# Patient Record
Sex: MALE | Race: WHITE | URBAN - METROPOLITAN AREA
[De-identification: names, ages, dates, MRNs, and addresses within clinical notes are randomized per-mention and may not be internally consistent; named-entity substitution may affect disease eponyms.]

---

## 2024-02-19 ENCOUNTER — OFFICE VISIT (OUTPATIENT)
Dept: GASTROENTEROLOGY | Age: 39
End: 2024-02-19
Payer: COMMERCIAL

## 2024-02-19 ENCOUNTER — TELEPHONE (OUTPATIENT)
Dept: GASTROENTEROLOGY | Age: 39
End: 2024-02-19

## 2024-02-19 VITALS — WEIGHT: 315 LBS | TEMPERATURE: 98.1 F | DIASTOLIC BLOOD PRESSURE: 87 MMHG | SYSTOLIC BLOOD PRESSURE: 165 MMHG

## 2024-02-19 DIAGNOSIS — E66.01 MORBID OBESITY (HCC): Primary | ICD-10-CM

## 2024-02-19 DIAGNOSIS — K58.1 IRRITABLE BOWEL SYNDROME WITH CONSTIPATION: ICD-10-CM

## 2024-02-19 DIAGNOSIS — R10.13 ABDOMINAL PAIN, EPIGASTRIC: ICD-10-CM

## 2024-02-19 DIAGNOSIS — K21.9 GASTROESOPHAGEAL REFLUX DISEASE, UNSPECIFIED WHETHER ESOPHAGITIS PRESENT: ICD-10-CM

## 2024-02-19 DIAGNOSIS — R13.19 OTHER DYSPHAGIA: ICD-10-CM

## 2024-02-19 DIAGNOSIS — K21.9 GASTROESOPHAGEAL REFLUX DISEASE, UNSPECIFIED WHETHER ESOPHAGITIS PRESENT: Primary | ICD-10-CM

## 2024-02-19 DIAGNOSIS — K59.01 SLOW TRANSIT CONSTIPATION: ICD-10-CM

## 2024-02-19 PROCEDURE — G8427 DOCREV CUR MEDS BY ELIG CLIN: HCPCS | Performed by: INTERNAL MEDICINE

## 2024-02-19 PROCEDURE — G8421 BMI NOT CALCULATED: HCPCS | Performed by: INTERNAL MEDICINE

## 2024-02-19 PROCEDURE — G8484 FLU IMMUNIZE NO ADMIN: HCPCS | Performed by: INTERNAL MEDICINE

## 2024-02-19 PROCEDURE — 4004F PT TOBACCO SCREEN RCVD TLK: CPT | Performed by: INTERNAL MEDICINE

## 2024-02-19 PROCEDURE — 99204 OFFICE O/P NEW MOD 45 MIN: CPT | Performed by: INTERNAL MEDICINE

## 2024-02-19 RX ORDER — CLONAZEPAM 0.5 MG/1
0.5 TABLET ORAL 2 TIMES DAILY PRN
COMMUNITY
Start: 2021-07-26

## 2024-02-19 RX ORDER — DICYCLOMINE HYDROCHLORIDE 10 MG/1
20 CAPSULE ORAL
COMMUNITY
Start: 2023-03-24

## 2024-02-19 RX ORDER — HYDROXYZINE HYDROCHLORIDE 25 MG/1
25 TABLET, FILM COATED ORAL
COMMUNITY
Start: 2023-07-10

## 2024-02-19 RX ORDER — DOXEPIN HYDROCHLORIDE 10 MG/1
10 CAPSULE ORAL NIGHTLY
COMMUNITY

## 2024-02-19 RX ORDER — PROPRANOLOL HYDROCHLORIDE 10 MG/1
10 TABLET ORAL 2 TIMES DAILY PRN
COMMUNITY

## 2024-02-19 RX ORDER — INSULIN GLARGINE 100 [IU]/ML
65 INJECTION, SOLUTION SUBCUTANEOUS 2 TIMES DAILY
COMMUNITY

## 2024-02-19 RX ORDER — AMLODIPINE BESYLATE 5 MG/1
5 TABLET ORAL
COMMUNITY
Start: 2021-08-23

## 2024-02-19 RX ORDER — FAMOTIDINE 20 MG/1
20 TABLET, FILM COATED ORAL
COMMUNITY
Start: 2022-02-23

## 2024-02-19 RX ORDER — PANTOPRAZOLE SODIUM 40 MG/1
40 TABLET, DELAYED RELEASE ORAL
COMMUNITY
Start: 2021-11-15

## 2024-02-19 RX ORDER — SUCRALFATE ORAL 1 G/10ML
1000 SUSPENSION ORAL 4 TIMES DAILY
COMMUNITY
Start: 2024-01-29 | End: 2024-02-28

## 2024-02-19 ASSESSMENT — ENCOUNTER SYMPTOMS
WHEEZING: 0
CHOKING: 0
DIARRHEA: 0
BLOOD IN STOOL: 0
VOMITING: 0
COLOR CHANGE: 0
COUGH: 0
RECTAL PAIN: 0
ANAL BLEEDING: 0
SHORTNESS OF BREATH: 0
TROUBLE SWALLOWING: 1
CONSTIPATION: 1
ABDOMINAL PAIN: 1
ABDOMINAL DISTENTION: 0
SORE THROAT: 0
NAUSEA: 0

## 2024-02-19 NOTE — PROGRESS NOTES
General: Bowel sounds are normal.      Palpations: Abdomen is soft.   Genitourinary:     Rectum: Normal.   Musculoskeletal:         General: Normal range of motion.      Cervical back: Normal range of motion and neck supple.   Skin:     General: Skin is warm.   Neurological:      Mental Status: He is alert and oriented to person, place, and time.      Deep Tendon Reflexes: Reflexes are normal and symmetric.           LABORATORY DATA: Reviewed  Lab Results   Component Value Date    WBC 9.0 09/11/2013    HGB 13.5 09/11/2013    HCT 40.6 (L) 09/11/2013    MCV 84.1 09/11/2013     09/11/2013     09/11/2013    K 3.9 09/11/2013    CL 99 09/11/2013    CO2 27 09/11/2013    BUN 16 09/11/2013    CREATININE 0.90 09/11/2013    LABPROT 8.2 06/01/2012    LABALBU 4.5 09/11/2013    BILITOT 0.54 09/11/2013    ALKPHOS 81 09/11/2013    AST 43 (H) 09/11/2013    ALT 56 (H) 09/11/2013         Lab Results   Component Value Date    RBC 4.83 09/11/2013    HGB 13.5 09/11/2013    MCV 84.1 09/11/2013    MCH 27.9 09/11/2013    MCHC 33.2 09/11/2013    RDW 15.3 09/11/2013    MPV 8.4 09/11/2013    BASOPCT 1 09/11/2013    LYMPHSABS 2.40 09/11/2013    MONOSABS 0.60 09/11/2013    NEUTROABS 5.80 09/11/2013    EOSABS 0.10 09/11/2013    BASOSABS 0.10 09/11/2013         DIAGNOSTIC TESTING:     No results found.       Assessment  1. Gastroesophageal reflux disease, unspecified whether esophagitis present    2. Abdominal pain, epigastric    3. Other dysphagia    4. Slow transit constipation    5. Irritable bowel syndrome with constipation        Plan    Plan EGD     Refer to [facility] weight loss clinic    The Endoscopic procedure was explained to the patient in detail  The prep and NPO were explained  All the Risks, Benefits, and Alternatives were explained  Risk of Bleeding, Perforation and Cardio Respiratory risks were explained  his questions were answered  The procedure has been scheduled with the  in the office  Patient was asked

## 2024-02-20 ENCOUNTER — ANESTHESIA EVENT (OUTPATIENT)
Dept: OPERATING ROOM | Age: 39
End: 2024-02-20
Payer: COMMERCIAL

## 2024-02-20 ENCOUNTER — ANESTHESIA (OUTPATIENT)
Dept: OPERATING ROOM | Age: 39
End: 2024-02-20
Payer: COMMERCIAL

## 2024-02-20 ENCOUNTER — HOSPITAL ENCOUNTER (OUTPATIENT)
Age: 39
Setting detail: OUTPATIENT SURGERY
Discharge: HOME | End: 2024-02-20
Attending: INTERNAL MEDICINE | Admitting: INTERNAL MEDICINE
Payer: COMMERCIAL

## 2024-02-20 VITALS
DIASTOLIC BLOOD PRESSURE: 92 MMHG | TEMPERATURE: 98.2 F | OXYGEN SATURATION: 98 % | SYSTOLIC BLOOD PRESSURE: 177 MMHG | BODY MASS INDEX: 40.43 KG/M2 | HEART RATE: 88 BPM | WEIGHT: 315 LBS | RESPIRATION RATE: 28 BRPM | HEIGHT: 74 IN

## 2024-02-20 DIAGNOSIS — R13.19 OTHER DYSPHAGIA: ICD-10-CM

## 2024-02-20 DIAGNOSIS — K58.1 IRRITABLE BOWEL SYNDROME WITH CONSTIPATION: Primary | ICD-10-CM

## 2024-02-20 DIAGNOSIS — K59.01 SLOW TRANSIT CONSTIPATION: ICD-10-CM

## 2024-02-20 DIAGNOSIS — R10.13 ABDOMINAL PAIN, EPIGASTRIC: ICD-10-CM

## 2024-02-20 LAB
GLUCOSE BLD-MCNC: 210 MG/DL (ref 75–110)
GLUCOSE BLD-MCNC: 237 MG/DL (ref 75–110)

## 2024-02-20 PROCEDURE — 3700000000 HC ANESTHESIA ATTENDED CARE: Performed by: INTERNAL MEDICINE

## 2024-02-20 PROCEDURE — 7100000011 HC PHASE II RECOVERY - ADDTL 15 MIN: Performed by: INTERNAL MEDICINE

## 2024-02-20 PROCEDURE — 7100000010 HC PHASE II RECOVERY - FIRST 15 MIN: Performed by: INTERNAL MEDICINE

## 2024-02-20 PROCEDURE — 2709999900 HC NON-CHARGEABLE SUPPLY: Performed by: INTERNAL MEDICINE

## 2024-02-20 PROCEDURE — 6360000002 HC RX W HCPCS: Performed by: NURSE ANESTHETIST, CERTIFIED REGISTERED

## 2024-02-20 PROCEDURE — 2580000003 HC RX 258: Performed by: ANESTHESIOLOGY

## 2024-02-20 PROCEDURE — 43235 EGD DIAGNOSTIC BRUSH WASH: CPT | Performed by: INTERNAL MEDICINE

## 2024-02-20 PROCEDURE — 2500000003 HC RX 250 WO HCPCS: Performed by: NURSE ANESTHETIST, CERTIFIED REGISTERED

## 2024-02-20 PROCEDURE — 82947 ASSAY GLUCOSE BLOOD QUANT: CPT

## 2024-02-20 PROCEDURE — 3609017100 HC EGD: Performed by: INTERNAL MEDICINE

## 2024-02-20 RX ORDER — SODIUM CHLORIDE 9 MG/ML
INJECTION, SOLUTION INTRAVENOUS PRN
Status: DISCONTINUED | OUTPATIENT
Start: 2024-02-20 | End: 2024-02-20 | Stop reason: HOSPADM

## 2024-02-20 RX ORDER — LIDOCAINE HYDROCHLORIDE 10 MG/ML
1 INJECTION, SOLUTION EPIDURAL; INFILTRATION; INTRACAUDAL; PERINEURAL
Status: DISCONTINUED | OUTPATIENT
Start: 2024-02-21 | End: 2024-02-20 | Stop reason: HOSPADM

## 2024-02-20 RX ORDER — ONDANSETRON 2 MG/ML
4 INJECTION INTRAMUSCULAR; INTRAVENOUS
Status: DISCONTINUED | OUTPATIENT
Start: 2024-02-20 | End: 2024-02-20 | Stop reason: HOSPADM

## 2024-02-20 RX ORDER — SODIUM CHLORIDE 9 MG/ML
INJECTION, SOLUTION INTRAVENOUS CONTINUOUS
Status: DISCONTINUED | OUTPATIENT
Start: 2024-02-20 | End: 2024-02-20 | Stop reason: HOSPADM

## 2024-02-20 RX ORDER — SODIUM CHLORIDE, SODIUM LACTATE, POTASSIUM CHLORIDE, CALCIUM CHLORIDE 600; 310; 30; 20 MG/100ML; MG/100ML; MG/100ML; MG/100ML
INJECTION, SOLUTION INTRAVENOUS CONTINUOUS
Status: DISCONTINUED | OUTPATIENT
Start: 2024-02-20 | End: 2024-02-20 | Stop reason: HOSPADM

## 2024-02-20 RX ORDER — FENTANYL CITRATE 50 UG/ML
25 INJECTION, SOLUTION INTRAMUSCULAR; INTRAVENOUS EVERY 5 MIN PRN
Status: DISCONTINUED | OUTPATIENT
Start: 2024-02-20 | End: 2024-02-20 | Stop reason: HOSPADM

## 2024-02-20 RX ORDER — SODIUM CHLORIDE 0.9 % (FLUSH) 0.9 %
5-40 SYRINGE (ML) INJECTION EVERY 12 HOURS SCHEDULED
Status: DISCONTINUED | OUTPATIENT
Start: 2024-02-20 | End: 2024-02-20 | Stop reason: HOSPADM

## 2024-02-20 RX ORDER — SODIUM CHLORIDE 0.9 % (FLUSH) 0.9 %
5-40 SYRINGE (ML) INJECTION PRN
Status: DISCONTINUED | OUTPATIENT
Start: 2024-02-20 | End: 2024-02-20 | Stop reason: HOSPADM

## 2024-02-20 RX ORDER — HYDROMORPHONE HYDROCHLORIDE 1 MG/ML
0.5 INJECTION, SOLUTION INTRAMUSCULAR; INTRAVENOUS; SUBCUTANEOUS EVERY 5 MIN PRN
Status: DISCONTINUED | OUTPATIENT
Start: 2024-02-20 | End: 2024-02-20 | Stop reason: HOSPADM

## 2024-02-20 RX ORDER — PROPOFOL 10 MG/ML
INJECTION, EMULSION INTRAVENOUS PRN
Status: DISCONTINUED | OUTPATIENT
Start: 2024-02-20 | End: 2024-02-20 | Stop reason: SDUPTHER

## 2024-02-20 RX ORDER — LIDOCAINE HYDROCHLORIDE 20 MG/ML
INJECTION, SOLUTION INFILTRATION; PERINEURAL PRN
Status: DISCONTINUED | OUTPATIENT
Start: 2024-02-20 | End: 2024-02-20 | Stop reason: SDUPTHER

## 2024-02-20 RX ADMIN — SODIUM CHLORIDE, POTASSIUM CHLORIDE, SODIUM LACTATE AND CALCIUM CHLORIDE: 600; 310; 30; 20 INJECTION, SOLUTION INTRAVENOUS at 07:51

## 2024-02-20 RX ADMIN — PROPOFOL 100 MG: 10 INJECTION, EMULSION INTRAVENOUS at 09:12

## 2024-02-20 RX ADMIN — LIDOCAINE HYDROCHLORIDE 100 MG: 20 INJECTION, SOLUTION INFILTRATION; PERINEURAL at 09:12

## 2024-02-20 RX ADMIN — PROPOFOL 100 MG: 10 INJECTION, EMULSION INTRAVENOUS at 09:13

## 2024-02-20 RX ADMIN — PROPOFOL 30 MG: 10 INJECTION, EMULSION INTRAVENOUS at 09:15

## 2024-02-20 ASSESSMENT — PAIN - FUNCTIONAL ASSESSMENT
PAIN_FUNCTIONAL_ASSESSMENT: 0-10
PAIN_FUNCTIONAL_ASSESSMENT: NONE - DENIES PAIN

## 2024-02-20 ASSESSMENT — PAIN DESCRIPTION - DESCRIPTORS: DESCRIPTORS: ACHING

## 2024-02-20 NOTE — H&P
History and Physical GI Update    Pt Name: Clive Carlos  MRN: [id number]  YOB: 1985  Date of evaluation: 2/20/2024      [x] I have reviewed the THE GASTROENTEROLOGY OFFICE PROGRESS NOTE found in [facility] dated 1/19/2024  by Dr. SEARS  which meets the criteria for an Interval History and Physical note and is attached below.    [x] I have examined  Clive Carlos and there are no changes to the patient or plans for a EGD .by Dr SEARS  for EVALUATION OF GERD AND DYSPHAGIA     Denies history of ulcers, hiatal hernia,HAS  acid reflux/GERD, AND  IBS. Previous EGD: Yes.  2012.      Patient today denies  fever, chills, night sweats, pain or unexplained weight loss. HE DOES NOT TAKE BLOOD THINNERS.HE HAS DIABETES. BLOOD SUGAR TODAY .PATIENT IS MORBIDLY OBESE AND HAS SLEEP APNEA  BMI IS 57.26    Vital signs: BP (!) 164/85   Pulse 95   Temp 96.8 °F (36 °C)   Resp 18   Ht 1.88 m (6' 2\")   Wt (!) 202.3 kg (446 lb)   SpO2 97%   BMI 57.26 kg/m²     Allergies:  Patient has no known allergies.    Medications:   No current facility-administered medications on file prior to encounter.     Current Outpatient Medications on File Prior to Encounter   Medication Sig Dispense Refill    amLODIPine (NORVASC) 5 MG tablet 1 tablet      clonazePAM (KLONOPIN) 0.5 MG tablet Take 1 tablet by mouth 2 times daily as needed.      diclofenac sodium (VOLTAREN) 1 % GEL APPLY 2 GRAMS TO SKIN FOUR TIMES A DAY FOR OSTEOARTHRITIS TO AFFECTED JOINT(S) AS DIRECTED FOR PAIN (USE ENCLOSED DOSING CARD TO ACCURATELY MEASURE EACH DOSE)  OF THE HAND, ELBOW OR WRIST. MAXIMUM 8G/DAY PER JOINT(  5/9/23) TO AFFECTED JOINT(S) AS DIRECTED FOR PAIN (USE ENCLOSED DOSING CARD TO ACCURATELY MEASURE EACH DOSE)  OF THE HAND, ELBOW OR WRIST. MAXIMUM 8G/DAY PER JOINT(  5/9/23)      dicyclomine (BENTYL) 10 MG capsule 2 capsules      doxepin (SINEQUAN) 10 MG capsule Take 1 capsule by mouth nightly      empagliflozin (JARDIANCE) 25 MG

## 2024-02-20 NOTE — ANESTHESIA PRE PROCEDURE
02/20/24 0733 02/20/24 0741   BP: (!) 193/104    Pulse: 95    Resp: 18    Temp: 96.8 °F (36 °C)    TempSrc:  Temporal   SpO2: 97%    Weight:  (!) 202.3 kg (446 lb)   Height:  1.88 m (6' 2\")                                              BP Readings from Last 3 Encounters:   02/20/24 (!) 193/104   02/19/24 (!) 165/87       NPO Status: Time of last liquid consumption: 2300                        Time of last solid consumption: 1830                        Date of last liquid consumption: 02/19/24                        Date of last solid food consumption: 02/19/24    BMI:   Wt Readings from Last 3 Encounters:   02/20/24 (!) 202.3 kg (446 lb)   02/19/24 (!) 202.8 kg (447 lb)     Body mass index is 57.26 kg/m².    CBC:   Lab Results   Component Value Date/Time    WBC 9.0 09/11/2013 03:56 PM    RBC 4.83 09/11/2013 03:56 PM    RBC 5.00 06/01/2012 11:25 PM    HGB 13.5 09/11/2013 03:56 PM    HCT 40.6 09/11/2013 03:56 PM    MCV 84.1 09/11/2013 03:56 PM    RDW 15.3 09/11/2013 03:56 PM     09/11/2013 03:56 PM     06/01/2012 11:25 PM       CMP:   Lab Results   Component Value Date/Time     09/11/2013 03:56 PM    K 3.9 09/11/2013 03:56 PM    CL 99 09/11/2013 03:56 PM    CO2 27 09/11/2013 03:56 PM    BUN 16 09/11/2013 03:56 PM    CREATININE 0.90 09/11/2013 03:56 PM    GFRAA >60 09/11/2013 03:56 PM    AGRATIO 1.3 09/11/2013 03:56 PM    LABGLOM >60 09/11/2013 03:56 PM    GLUCOSE 164 09/11/2013 03:56 PM    GLUCOSE 223 06/05/2012 05:30 AM    PROT 7.9 09/11/2013 03:56 PM    CALCIUM 9.6 09/11/2013 03:56 PM    BILITOT 0.54 09/11/2013 03:56 PM    ALKPHOS 81 09/11/2013 03:56 PM    AST 43 09/11/2013 03:56 PM    ALT 56 09/11/2013 03:56 PM       POC Tests: No results for input(s): \"POCGLU\", \"POCNA\", \"POCK\", \"POCCL\", \"POCBUN\", \"POCHEMO\", \"POCHCT\" in the last 72 hours.    Coags: No results found for: \"PROTIME\", \"INR\", \"APTT\"    HCG (If Applicable): No results found for: \"PREGTESTUR\", \"PREGSERUM\", \"HCG\", \"HCGQUANT\"     ABGs: No

## 2024-02-20 NOTE — ANESTHESIA POSTPROCEDURE EVALUATION
Department of Anesthesiology  Postprocedure Note    Patient: Clive Carlos  MRN: [id number]  YOB: 1985  Date of evaluation: 2/20/2024    Procedure Summary       Date: 02/20/24 Room / Location: [address]    Anesthesia Start: 0908 Anesthesia Stop: 0926    Procedure: EGD ESOPHAGOGASTRODUODENOSCOPY Diagnosis:       Gastroesophageal reflux disease, unspecified whether esophagitis present      (Gastroesophageal reflux disease, unspecified whether esophagitis present [K21.9])    Surgeons: Jael Brian MD Responsible Provider: Ginger Figueredo MD    Anesthesia Type: MAC ASA Status: 3            Anesthesia Type: No value filed.    Dulce Phase I: Dulce Score: 10    Dulce Phase II: Dulce Score: 10    Anesthesia Post Evaluation    Patient location during evaluation: PACU  Patient participation: complete - patient participated  Level of consciousness: awake and alert  Airway patency: patent  Nausea & Vomiting: no nausea and no vomiting  Cardiovascular status: hemodynamically stable  Respiratory status: acceptable  Hydration status: euvolemic  Pain management: adequate    No notable events documented.

## 2024-02-20 NOTE — OP NOTE
PROCEDURE NOTE    DATE OF PROCEDURE: 2/20/2024     SURGEON: Jael Brian MD    ASSISTANT: None    PREOPERATIVE DIAGNOSIS: EPIG PAINS  NAUSEA  DYSPHAGIA  GERD    POSTOPERATIVE DIAGNOSIS: As described below    OPERATION: Upper GI endoscopy with Biopsy    ANESTHESIA: MAC PER ANESTHESIA     ESTIMATED BLOOD LOSS: Less than 50 ml    COMPLICATIONS: None.     SPECIMENS:  Was Not Obtained    HISTORY: The patient is a 39 y.o. year old male with history of above preop diagnosis.  I recommended esophagogastroduodenoscopy with possible biopsy and I explained the risk, benefits, expected outcome, and alternatives to the procedure.  Risks included but are not limited to bleeding, infection, respiratory distress, hypotension, and perforation of the esophagus, stomach, or duodenum.  Patient understands and is in agreement.    PROCEDURE: The patient was given IV conscious sedation.  The patient's SPO2 remained above 90% throughout the procedure. The gastroscope was inserted orally and advanced under direct vision through the esophagus, through the stomach, through the pylorus, and into the descending duodenum.      Findings:    Retropharyngeal area was grossly normal appearing    Esophagus: normal  SOME REFLEXED FOOD FROM STOMACH  NO LUMINAL OBSTRUCTION, LESIONS ESOPHAGITIS NOTED   Stomach:  LARGE AMOUNT OF RETAINED SOLD FOOD  VISUALIZATION WAS VERY LIMITED  SCOPE WAS MANAGED TO BE ADVANCED BETWEEN FOOD INTO ANTRUM  NO LUMINAL OBSTRUCTION    Duodenum:     Descending: abnormal: RETAINED PASTY FOOD  NO OBVIOUS LUMINAL OBSTRUCTION     Bulb: abnormal: AS ABOVE    The scope was removed and the patient tolerated the procedure well.     Recommendations/Plan:   GASTRIC EMPTYING SCAN  REPEAT EGD AFTER THAT  F/U In Office in 1-2 weeks  Discussed with the family  Post sedation patient was stable with stable vital signs and stable O2 saturations    Electronically signed by Jael Brian MD  on 2/20/2024 at 9:18 AM

## 2024-02-20 NOTE — DISCHARGE INSTRUCTIONS
Upper GI Endoscopy: What to Expect at Home  Your Recovery  After you have an endoscopy, you will stay at the hospital or clinic for 1 to 2 hours. This will allow the medicine to wear off. You will be able to go home after your doctor or nurse checks to make sure you are not having any problems.  You may have a sore throat for a day or two after the test.  This care sheet gives you a general idea about what to expect after the test.  How can you care for yourself at home?  Activity  Rest as much as you need to after you go home.  You should be able to go back to your usual activities the day after the test.  Diet  Follow your doctor's directions for eating after the test.  Drink plenty of fluids (unless your doctor has told you not to).  Follow-up care is a key part of your treatment and safety. Be sure to make and go to all appointments, and call your doctor if you are having problems. It's also a good idea to know your test results and keep a list of the medicines you take.  When should you call for help?  Call 911 anytime you think you may need emergency care. For example, call if:  You passed out (lost consciousness).  You cough up blood.  You vomit blood or what looks like coffee grounds.  You pass maroon or very bloody stools.  Call your doctor now or seek immediate medical care if:  You have trouble swallowing.  You have belly pain.  Your stools are black and tarlike or have streaks of blood.  You are sick to your stomach or cannot keep fluids down.  Watch closely for changes in your health, and be sure to contact your doctor if:  Your throat still hurts after a day or two.  You do not get better as expected.   Where can you learn more?   Go to [email or web address] and sign in to your [employer] account. Enter J454 in the Search Health Information box to learn more about “Upper GI Endoscopy: What to Expect at Home.”    If you do not have an account, please click on the “Sign Up Now” link.

## 2024-03-19 ENCOUNTER — OFFICE VISIT (OUTPATIENT)
Dept: GASTROENTEROLOGY | Age: 39
End: 2024-03-19
Payer: COMMERCIAL

## 2024-03-19 VITALS
WEIGHT: 315 LBS | BODY MASS INDEX: 57.26 KG/M2 | SYSTOLIC BLOOD PRESSURE: 189 MMHG | DIASTOLIC BLOOD PRESSURE: 95 MMHG | TEMPERATURE: 97.7 F

## 2024-03-19 DIAGNOSIS — K58.1 IRRITABLE BOWEL SYNDROME WITH CONSTIPATION: ICD-10-CM

## 2024-03-19 DIAGNOSIS — K59.01 SLOW TRANSIT CONSTIPATION: Primary | ICD-10-CM

## 2024-03-19 DIAGNOSIS — K31.84 GASTROPARESIS: ICD-10-CM

## 2024-03-19 DIAGNOSIS — K31.89 RETAINED FOOD IN STOMACH: ICD-10-CM

## 2024-03-19 DIAGNOSIS — R13.19 OTHER DYSPHAGIA: ICD-10-CM

## 2024-03-19 DIAGNOSIS — E66.01 MORBID OBESITY (HCC): ICD-10-CM

## 2024-03-19 PROCEDURE — 4004F PT TOBACCO SCREEN RCVD TLK: CPT | Performed by: INTERNAL MEDICINE

## 2024-03-19 PROCEDURE — 99214 OFFICE O/P EST MOD 30 MIN: CPT | Performed by: INTERNAL MEDICINE

## 2024-03-19 PROCEDURE — G8484 FLU IMMUNIZE NO ADMIN: HCPCS | Performed by: INTERNAL MEDICINE

## 2024-03-19 PROCEDURE — G8427 DOCREV CUR MEDS BY ELIG CLIN: HCPCS | Performed by: INTERNAL MEDICINE

## 2024-03-19 PROCEDURE — G8417 CALC BMI ABV UP PARAM F/U: HCPCS | Performed by: INTERNAL MEDICINE

## 2024-03-19 RX ORDER — LINACLOTIDE 290 UG/1
290 CAPSULE, GELATIN COATED ORAL
Qty: 30 CAPSULE | Refills: 3 | Status: SHIPPED | OUTPATIENT
Start: 2024-03-19

## 2024-03-19 ASSESSMENT — ENCOUNTER SYMPTOMS
COUGH: 0
WHEEZING: 0
SORE THROAT: 0
RECTAL PAIN: 0
ANAL BLEEDING: 0
CONSTIPATION: 1
ABDOMINAL PAIN: 1
NAUSEA: 0
TROUBLE SWALLOWING: 1
COLOR CHANGE: 0
ABDOMINAL DISTENTION: 0
DIARRHEA: 0
SHORTNESS OF BREATH: 0
CHOKING: 0
BLOOD IN STOOL: 0
VOMITING: 0

## 2024-03-19 NOTE — PROGRESS NOTES
GI CLINIC FOLLOW UP    NTERVAL HISTORY:   No referring provider defined for this encounter.    Chief Complaint   Patient presents with    Results     Pt is here today for a f/u from EGD procedure completed on 2/20/24.       1. Slow transit constipation    2. Irritable bowel syndrome with constipation    3. Other dysphagia    4. Gastroparesis    5. Retained food in stomach    6. Morbid obesity (HCC)      This patient seen my office as a follow-up after his recent upper endoscopy which revealed presence of large amount of food in the stomach gastric emptying scan was ordered but has not been done yet    He is a  and has a lot of issues with abdominal bloating gas cramping he says that he had a colonoscopy done in [employer] and was found to have polyps however they were not removed?  He was told that they may give him a problem in future?    Complains of significant constipation issues 1 bowel movements every 4 to 5 days with some straining bloating gas and cramping    He denies taking any narcotic pain medications    Patient's has morbid obesity    No overt rectal bleeding or melanotic stool    No known family history for colon cancer    Patient has been complaining of some abdominal pains, off and on cramping  Also complains of abdominal bloating and gas  Has off and on nausea without any sig vomiting    Has no weight loss  Has some anxiety issues              HISTORY OF PRESENT ILLNESS: Mr.Michael ANAM Carlos is a 39 y.o. male with a past history remarkable for , referred for evaluation of   Chief Complaint   Patient presents with    Results     Pt is here today for a f/u from EGD procedure completed on 2/20/24.   .    Past Medical,Family, and Social History reviewed and does contribute to the patient presenting condition.    Patient's PMH/PSH,SH,PSYCH Hx, MEDs, ALLERGIES, and ROS were all reviewed and updated in the appropriate sections.    PAST MEDICAL HISTORY:  Past Medical History:   Diagnosis Date

## 2024-03-20 NOTE — TELEPHONE ENCOUNTER
Procedure scheduled/Dr KEO Brian  Procedure:COLON  Date:08/28/2024  Time:9:00 AM  Hospital:[facility]  Bowel Prep instructions given:MIRALAX/DULC  In office/via phone: IN OFFICE  Clearance needed:NO

## 2024-04-18 DIAGNOSIS — K59.01 SLOW TRANSIT CONSTIPATION: Primary | ICD-10-CM

## 2024-04-18 DIAGNOSIS — K58.1 IRRITABLE BOWEL SYNDROME WITH CONSTIPATION: ICD-10-CM

## 2024-04-18 NOTE — TELEPHONE ENCOUNTER
Patient's wife called as the Linzess was denied until the Trulance is tried and failed. Please sign Trulance RX

## 2024-04-26 ENCOUNTER — TELEPHONE (OUTPATIENT)
Dept: GASTROENTEROLOGY | Age: 39
End: 2024-04-26

## 2024-04-26 NOTE — TELEPHONE ENCOUNTER
Carrie called to report patient has scheduled a total of five gastric emptying study test and have \"No showed\" to all of them. They have tried to reach out to patient multiple times with no success.

## 2024-05-09 RX ORDER — BISACODYL 5 MG/1
TABLET, DELAYED RELEASE ORAL
Qty: 4 TABLET | Refills: 0 | Status: SHIPPED | OUTPATIENT
Start: 2024-05-09

## 2024-05-09 RX ORDER — POLYETHYLENE GLYCOL 3350 17 G/17G
POWDER, FOR SOLUTION ORAL
Qty: 238 G | Refills: 0 | Status: SHIPPED | OUTPATIENT
Start: 2024-05-09

## 2024-05-16 ENCOUNTER — TELEPHONE (OUTPATIENT)
Dept: BARIATRICS/WEIGHT MGMT | Age: 39
End: 2024-05-16

## 2024-05-16 NOTE — TELEPHONE ENCOUNTER
I left a message for the Patient to call the office regarding missed appt to reschedule on 05/16/24.

## 2024-05-31 ENCOUNTER — INITIAL CONSULT (OUTPATIENT)
Dept: BARIATRICS/WEIGHT MGMT | Age: 39
End: 2024-05-31
Payer: COMMERCIAL

## 2024-05-31 VITALS
DIASTOLIC BLOOD PRESSURE: 70 MMHG | HEART RATE: 90 BPM | HEIGHT: 74 IN | BODY MASS INDEX: 40.43 KG/M2 | WEIGHT: 315 LBS | SYSTOLIC BLOOD PRESSURE: 130 MMHG

## 2024-05-31 DIAGNOSIS — K31.84 GASTROPARESIS: ICD-10-CM

## 2024-05-31 DIAGNOSIS — E66.01 MORBID OBESITY WITH BMI OF 50.0-59.9, ADULT (HCC): ICD-10-CM

## 2024-05-31 DIAGNOSIS — K21.9 GASTROESOPHAGEAL REFLUX DISEASE WITHOUT ESOPHAGITIS: Primary | ICD-10-CM

## 2024-05-31 PROCEDURE — 4004F PT TOBACCO SCREEN RCVD TLK: CPT | Performed by: SURGERY

## 2024-05-31 PROCEDURE — 99204 OFFICE O/P NEW MOD 45 MIN: CPT | Performed by: SURGERY

## 2024-05-31 PROCEDURE — G8427 DOCREV CUR MEDS BY ELIG CLIN: HCPCS | Performed by: SURGERY

## 2024-05-31 PROCEDURE — G8417 CALC BMI ABV UP PARAM F/U: HCPCS | Performed by: SURGERY

## 2024-05-31 NOTE — PROGRESS NOTES
[facility], [facility] INVASIVE BARIATRIC SURG  [address]  [address]  [place] [id number]  Dept: [phone]    ROBOTIC AND MINIMALLY INVASIVE SURGERY  PROGRESS NOTE INITIAL EVALUATION     Patient: Clive Carlos        Service Date: 5/31/2024      HPI:     Chief Complaint   Patient presents with    Surgical Consult     gerd       History: 39 y.o. male who presents today for GERD. He follows with Dr. Jael Brian, GI, and he was last seen on 3/19/2024 for dysphagia and IBS. He recommended an EGD and colonoscopy, ordered a gastric emptying study, ordered a CT abdomen and pelvis, and referred to bariatric surgery. He completed the EGD/colonoscopy on 2/20/2024. He has not yet completed the emptying study or CT, states these were rescheduled.     Today, patient reports experiencing heartburn and reflux most days. Admits triggers including spicy foods and tomato-based products. Patient is currently taking Famotidine for management of heartburn. Patient reports regular bowel movements. He denies nausea, vomiting, fever, and chills. He uses chewing tobacco daily. Admits occasional alcohol use.     The patient is a pleasant 39 y.o. year old male  with morbid obesity, who stands Height: 188 cm (6' 2\") tall with a weight of Weight - Scale: (!) 199.6 kg (440 lb) , resulting in a BMI of Body mass index is 56.49 kg/m².. The patient suffers from multiple co-morbidities as a result of morbid obesity, including: Type 2 Diabetes Mellitus, Obstructive Sleep Apnea, Gastroparesis, GERD. He has suffered from obesity for many years.    The patient has failed multiple attempts at non-surgical weight loss, and is now seeking surgical intervention to promote permanent and consistent weight loss. He  has chosen Sleeve Gastrectomy.  He is well educated regarding it.    The patient denies  a history of myocardial infarction, deep vein thrombosis, pulmonary embolism, renal failure, hepatic

## 2024-06-25 ENCOUNTER — TELEPHONE (OUTPATIENT)
Dept: BARIATRICS/WEIGHT MGMT | Age: 39
End: 2024-06-25

## 2024-06-25 NOTE — TELEPHONE ENCOUNTER
Online Info Session Completed:  on 6/11/24   Verified Insurance Benefit   with [facility]    Patient informed the following:  not a covered benefit.  Spoke with wife Hung she will speak with patient and see how he would like to move forward.

## 2025-02-05 ENCOUNTER — OFFICE VISIT (OUTPATIENT)
Dept: FAMILY MEDICINE CLINIC | Age: 40
End: 2025-02-05
Payer: OTHER GOVERNMENT

## 2025-02-05 VITALS
TEMPERATURE: 98.5 F | OXYGEN SATURATION: 98 % | HEART RATE: 100 BPM | SYSTOLIC BLOOD PRESSURE: 157 MMHG | DIASTOLIC BLOOD PRESSURE: 84 MMHG

## 2025-02-05 DIAGNOSIS — J06.9 VIRAL URI WITH COUGH: Primary | ICD-10-CM

## 2025-02-05 PROBLEM — E11.9 TYPE 2 DIABETES MELLITUS (HCC): Status: ACTIVE | Noted: 2021-11-14

## 2025-02-05 PROBLEM — E78.5 HYPERLIPIDEMIA: Status: ACTIVE | Noted: 2021-11-14

## 2025-02-05 PROBLEM — E55.9 VITAMIN D DEFICIENCY: Status: ACTIVE | Noted: 2021-11-14

## 2025-02-05 PROBLEM — R10.9 ABDOMINAL PAIN: Status: ACTIVE | Noted: 2025-02-05

## 2025-02-05 PROBLEM — K52.9 GASTROENTERITIS: Status: ACTIVE | Noted: 2025-02-05

## 2025-02-05 PROCEDURE — 99203 OFFICE O/P NEW LOW 30 MIN: CPT

## 2025-02-05 RX ORDER — FLUTICASONE PROPIONATE 50 MCG
2 SPRAY, SUSPENSION (ML) NASAL DAILY
Qty: 16 G | Refills: 0 | Status: SHIPPED | OUTPATIENT
Start: 2025-02-05

## 2025-02-05 RX ORDER — GUAIFENESIN 600 MG/1
600 TABLET, EXTENDED RELEASE ORAL 2 TIMES DAILY
Qty: 30 TABLET | Refills: 0 | Status: SHIPPED | OUTPATIENT
Start: 2025-02-05 | End: 2025-02-20

## 2025-02-05 RX ORDER — DEXTROMETHORPHAN HYDROBROMIDE AND PROMETHAZINE HYDROCHLORIDE 15; 6.25 MG/5ML; MG/5ML
5 SYRUP ORAL 3 TIMES DAILY PRN
Qty: 105 ML | Refills: 0 | Status: SHIPPED | OUTPATIENT
Start: 2025-02-05 | End: 2025-02-12

## 2025-02-05 ASSESSMENT — ENCOUNTER SYMPTOMS
SINUS PAIN: 0
DIARRHEA: 1
EYE PAIN: 0
COUGH: 1
EYE REDNESS: 0
VOMITING: 1
RHINORRHEA: 1
SWOLLEN GLANDS: 0
WHEEZING: 0
SORE THROAT: 1
NAUSEA: 1
EYE ITCHING: 0

## 2025-02-05 NOTE — PROGRESS NOTES
[facility] PHYSICIANS [facility], [facility] WALK-IN FAMILY MEDICINE  [address]  [address]  [facility] [address]  Dept: [phone]  Dept Fax: [phone]    Clive Carlos is a 40 y.o. male who presents to the urgent care today for his medical conditions/complaints as notedbelow.  Clive Carlos is c/o of Vomiting (Onset this morning, ear pain, cough, and diarrhea. )      HPI:     Patient presents to the Walk In Clinic for evaluation of URI symptoms, onset today.     No care team member to display      Cold Symptoms   This is a new problem. The current episode started today. The problem has been rapidly worsening. There has been no fever. Associated symptoms include congestion, coughing, diarrhea, ear pain, nausea, rhinorrhea, a sore throat and vomiting. Pertinent negatives include no chest pain, dysuria, headaches, joint pain, joint swelling, neck pain, plugged ear sensation, rash, sinus pain, sneezing, swollen glands or wheezing. He has tried nothing for the symptoms.       Past Medical History:   Diagnosis Date    Arthritis     Constipation     Diabetes mellitus (HCC)     GERD (gastroesophageal reflux disease)     Hypertension     Sleep apnea     [facility]        Current Outpatient Medications   Medication Sig Dispense Refill    promethazine-dextromethorphan (PROMETHAZINE-DM) 6.25-15 MG/5ML syrup Take 5 mLs by mouth 3 times daily as needed for Cough 105 mL 0    fluticasone (FLONASE) 50 MCG/ACT nasal spray 2 sprays by Each Nostril route daily 16 g 0    guaiFENesin (MUCINEX) 600 MG extended release tablet Take 1 tablet by mouth 2 times daily for 15 days 30 tablet 0    polyethylene glycol (GLYCOLAX) 17 GM/SCOOP powder Please follow instructions as given to you by your provider 238 g 0    bisacodyl 5 MG EC tablet Please follow instructions as given to you by your provider 4 tablet 0    Plecanatide 3 MG TABS Take 1 tablet by mouth daily 30 tablet 3

## 2025-02-19 ENCOUNTER — OFFICE VISIT (OUTPATIENT)
Dept: FAMILY MEDICINE CLINIC | Age: 40
End: 2025-02-19
Payer: OTHER GOVERNMENT

## 2025-02-19 VITALS
TEMPERATURE: 98.7 F | OXYGEN SATURATION: 97 % | DIASTOLIC BLOOD PRESSURE: 74 MMHG | SYSTOLIC BLOOD PRESSURE: 184 MMHG | HEART RATE: 107 BPM

## 2025-02-19 DIAGNOSIS — H66.002 NON-RECURRENT ACUTE SUPPURATIVE OTITIS MEDIA OF LEFT EAR WITHOUT SPONTANEOUS RUPTURE OF TYMPANIC MEMBRANE: Primary | ICD-10-CM

## 2025-02-19 DIAGNOSIS — R05.1 ACUTE COUGH: ICD-10-CM

## 2025-02-19 PROCEDURE — 99213 OFFICE O/P EST LOW 20 MIN: CPT

## 2025-02-19 RX ORDER — PAROXETINE 10 MG/1
10 TABLET, FILM COATED ORAL
COMMUNITY
Start: 2025-01-06

## 2025-02-19 RX ORDER — DEXTROMETHORPHAN HYDROBROMIDE AND PROMETHAZINE HYDROCHLORIDE 15; 6.25 MG/5ML; MG/5ML
5 SYRUP ORAL 3 TIMES DAILY PRN
Qty: 105 ML | Refills: 0 | Status: SHIPPED | OUTPATIENT
Start: 2025-02-19 | End: 2025-02-26

## 2025-02-19 ASSESSMENT — ENCOUNTER SYMPTOMS
COUGH: 1
ABDOMINAL PAIN: 1
SWOLLEN GLANDS: 0
DIARRHEA: 1
SORE THROAT: 0
EYE REDNESS: 0
NAUSEA: 1
EYE PAIN: 0
RHINORRHEA: 1
EYE ITCHING: 0
WHEEZING: 0

## 2025-02-19 NOTE — PROGRESS NOTES
[facility] PHYSICIANS [facility], [facility] WALK-IN FAMILY MEDICINE  [address]  [address]  [facility] [address]  Dept: [phone]  Dept Fax: [phone]    Clive Carlos is a 40 y.o. male who presents to the urgent care today for his medical conditions/complaints as notedbelow.  Clive Carlos is c/o of Cold Symptoms (Onset for 2 days with cough, stomach hurts and nausea. Otc cough medicine.)      HPI:     Patient presents to the Walk In Clinic for evaluation of URI symptoms, onset 2 days    No care team member to display        Cold Symptoms   This is a new problem. Episode onset: in the past 2 days. The problem has been gradually worsening. Associated symptoms include abdominal pain, congestion, coughing, diarrhea, ear pain, headaches, nausea, a plugged ear sensation and rhinorrhea. Pertinent negatives include no dysuria, joint pain, joint swelling, neck pain, rash, sneezing, sore throat, swollen glands or wheezing. Treatments tried: Robitussin. The treatment provided no relief.       Past Medical History:   Diagnosis Date    Arthritis     Constipation     Diabetes mellitus (HCC)     GERD (gastroesophageal reflux disease)     Hypertension     Sleep apnea     [facility]        Current Outpatient Medications   Medication Sig Dispense Refill    PARoxetine (PAXIL) 10 MG tablet Take 1 tablet by mouth      amoxicillin-clavulanate (AUGMENTIN) 875-125 MG per tablet Take 1 tablet by mouth 2 times daily for 10 days 20 tablet 0    promethazine-dextromethorphan (PROMETHAZINE-DM) 6.25-15 MG/5ML syrup Take 5 mLs by mouth 3 times daily as needed for Cough 105 mL 0    fluticasone (FLONASE) 50 MCG/ACT nasal spray 2 sprays by Each Nostril route daily 16 g 0    guaiFENesin (MUCINEX) 600 MG extended release tablet Take 1 tablet by mouth 2 times daily for 15 days 30 tablet 0    polyethylene glycol (GLYCOLAX) 17 GM/SCOOP powder Please follow instructions as given to you by

## 2025-04-16 ENCOUNTER — OFFICE VISIT (OUTPATIENT)
Dept: FAMILY MEDICINE CLINIC | Age: 40
End: 2025-04-16
Payer: OTHER GOVERNMENT

## 2025-04-16 VITALS
HEART RATE: 79 BPM | TEMPERATURE: 98 F | DIASTOLIC BLOOD PRESSURE: 77 MMHG | SYSTOLIC BLOOD PRESSURE: 174 MMHG | OXYGEN SATURATION: 97 %

## 2025-04-16 DIAGNOSIS — R11.2 NAUSEA AND VOMITING, UNSPECIFIED VOMITING TYPE: ICD-10-CM

## 2025-04-16 DIAGNOSIS — I10 ELEVATED BLOOD PRESSURE READING IN OFFICE WITH DIAGNOSIS OF HYPERTENSION: ICD-10-CM

## 2025-04-16 DIAGNOSIS — R09.89 SYMPTOMS OF UPPER RESPIRATORY INFECTION (URI): Primary | ICD-10-CM

## 2025-04-16 LAB
INFLUENZA A ANTIGEN, POC: NEGATIVE
INFLUENZA B ANTIGEN, POC: NEGATIVE
LOT EXPIRE DATE: NORMAL
LOT KIT NUMBER: NORMAL
SARS-COV-2, POC: NORMAL
VALID INTERNAL CONTROL: NORMAL
VENDOR AND KIT NAME POC: NORMAL

## 2025-04-16 PROCEDURE — 3078F DIAST BP <80 MM HG: CPT | Performed by: NURSE PRACTITIONER

## 2025-04-16 PROCEDURE — 99213 OFFICE O/P EST LOW 20 MIN: CPT | Performed by: NURSE PRACTITIONER

## 2025-04-16 PROCEDURE — 87428 SARSCOV & INF VIR A&B AG IA: CPT | Performed by: NURSE PRACTITIONER

## 2025-04-16 PROCEDURE — 3077F SYST BP >= 140 MM HG: CPT | Performed by: NURSE PRACTITIONER

## 2025-04-16 RX ORDER — ONDANSETRON 4 MG/1
4 TABLET, ORALLY DISINTEGRATING ORAL EVERY 6 HOURS PRN
Qty: 21 TABLET | Refills: 0 | Status: SHIPPED | OUTPATIENT
Start: 2025-04-16

## 2025-04-16 RX ORDER — BENZONATATE 100 MG/1
100 CAPSULE ORAL 3 TIMES DAILY PRN
Qty: 21 CAPSULE | Refills: 0 | Status: SHIPPED | OUTPATIENT
Start: 2025-04-16 | End: 2025-04-23

## 2025-04-16 RX ORDER — FLUTICASONE PROPIONATE 50 MCG
2 SPRAY, SUSPENSION (ML) NASAL DAILY
Qty: 16 G | Refills: 0 | Status: SHIPPED | OUTPATIENT
Start: 2025-04-16

## 2025-04-16 ASSESSMENT — ENCOUNTER SYMPTOMS
COUGH: 1
SINUS PRESSURE: 0
WHEEZING: 0
CHEST TIGHTNESS: 0
RHINORRHEA: 1
SINUS PAIN: 0
SWOLLEN GLANDS: 0
VOMITING: 1
DIARRHEA: 0
SORE THROAT: 0
ABDOMINAL PAIN: 0
CHOKING: 0
TROUBLE SWALLOWING: 0
STRIDOR: 0
NAUSEA: 1
VOICE CHANGE: 0
SHORTNESS OF BREATH: 0

## 2025-04-16 NOTE — PROGRESS NOTES
[facility] PHYSICIANS [facility], [facility] WALK-IN FAMILY MEDICINE  [address]  [address]  [facility] [address]  Dept: [phone]  Dept Fax: [phone]    Clive Carlos is a 40 y.o. male who presents to the urgent care today for his medical conditions/complaints as notedbelow.  Clive Carlos is c/o of Vomiting (Onset since midnight with body aches, dizzy, coughing, nasal congestion and chills. )      HPI:     40-year-old male presents for viral URI symptoms as well as generalized bodyaches, nausea and vomiting and headache.  No known ill exposure.  No known fevers.  Symptoms started 8 hours prior to arrival  Called in sick to work, needs note  Would like COVID and flu test    Cold Symptoms   This is a new problem. Episode onset: 8 hours. There has been no fever. Associated symptoms include congestion, coughing, headaches, nausea, rhinorrhea and vomiting. Pertinent negatives include no abdominal pain, chest pain, diarrhea, dysuria, ear pain, joint pain, joint swelling, neck pain, plugged ear sensation, rash, sinus pain, sneezing, sore throat, swollen glands or wheezing. Associated symptoms comments: Gen body aches  Dizziness  chills. He has tried nothing for the symptoms. The treatment provided no relief.       Past Medical History:   Diagnosis Date    Arthritis     Constipation     Diabetes mellitus (HCC)     GERD (gastroesophageal reflux disease)     Hypertension     Sleep apnea     [facility]        Current Outpatient Medications   Medication Sig Dispense Refill    ondansetron (ZOFRAN-ODT) 4 MG disintegrating tablet Take 1 tablet by mouth every 6 hours as needed for Nausea or Vomiting 21 tablet 0    benzonatate (TESSALON PERLES) 100 MG capsule Take 1 capsule by mouth 3 times daily as needed for Cough 21 capsule 0    fluticasone (FLONASE) 50 MCG/ACT nasal spray 2 sprays by Each Nostril route daily 16 g 0    PARoxetine (PAXIL) 10 MG tablet Take 1

## (undated) DEVICE — STAZ ENDO KIT: Brand: MEDLINE INDUSTRIES, INC.

## (undated) DEVICE — GLOVE SURG 8 11.7IN BEAD CUF LIGHT BRN SENSICARE LTX FREE

## (undated) DEVICE — BITEBLOCK ENDOSCP 60FR MAXI WHT POLYETH STURDY W/ VELC WVN